# Patient Record
Sex: MALE | Race: WHITE | NOT HISPANIC OR LATINO | URBAN - METROPOLITAN AREA
[De-identification: names, ages, dates, MRNs, and addresses within clinical notes are randomized per-mention and may not be internally consistent; named-entity substitution may affect disease eponyms.]

---

## 2019-06-05 ENCOUNTER — OFFICE (OUTPATIENT)
Dept: URBAN - METROPOLITAN AREA CLINIC 19 | Facility: CLINIC | Age: 34
End: 2019-06-05

## 2019-06-05 VITALS
DIASTOLIC BLOOD PRESSURE: 81 MMHG | SYSTOLIC BLOOD PRESSURE: 140 MMHG | HEART RATE: 66 BPM | WEIGHT: 209 LBS | HEIGHT: 70 IN

## 2019-06-05 DIAGNOSIS — K64.4 RESIDUAL HEMORRHOIDAL SKIN TAGS: ICD-10-CM

## 2019-06-05 DIAGNOSIS — K62.5 HEMORRHAGE OF ANUS AND RECTUM: ICD-10-CM

## 2019-06-05 PROCEDURE — 99203 OFFICE O/P NEW LOW 30 MIN: CPT | Performed by: NURSE PRACTITIONER

## 2019-06-05 RX ORDER — HYDROCORTISONE ACETATE 25 MG/1
50 SUPPOSITORY RECTAL
Qty: 30 | Refills: 1 | Status: ACTIVE
Start: 2019-06-05

## 2019-06-05 NOTE — SERVICEHPINOTES
Mr. Cervanets is referred by Dr. Kohler for hemorrhoids.Minna Cervantes is a   33-year-old white male who presents with hemorrhoids.  For the last 10 years, he has been seeing scant bright red blood on the toilet paper.  When he would see the blood, he also discomfort in his rectum and felt a hemorrhoid.  In April, he began seeing a larger amount of blood on the toilet paper and  some in the toilet.  He was having some pain after stools and  his hemorrhoid was enlarged and tender.  He began using tucks pads and preparation H, which improved the pain and bleeding.  He has not had any more bleeding or discomfort in the last 6 weeks.  The bright red blood in hemorrhoids began when he was having more issues with his bowel movements and alternating between constipation and diarrhea, while in his 20s.  Now that his diet has improved, he is having normal, soft stools 2 to 3 times a day.MEG